# Patient Record
Sex: MALE | Race: WHITE | NOT HISPANIC OR LATINO | URBAN - METROPOLITAN AREA
[De-identification: names, ages, dates, MRNs, and addresses within clinical notes are randomized per-mention and may not be internally consistent; named-entity substitution may affect disease eponyms.]

---
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
## 2020-10-13 NOTE — ED PROVIDER NOTE - NO SIGNIFICANT PAST SURGICAL HISTORY
Quality 110: Preventive Care And Screening: Influenza Immunization: Influenza Immunization Administered during Influenza season Detail Level: Detailed <<----- Click to add NO significant Past Surgical History

## 2021-10-29 NOTE — ED PROVIDER NOTE - CROS ED ROS STATEMENT
10/29/21 0900   C-SSRS (Frequent Screen)   2. Have you actually had any thoughts of killing yourself? Yes   3. Have you been thinking about how you might do this? No   4. Have you had these thoughts and had some intention of acting on them? No   5. Have you started to work out or worked out the details of how to kill yourself? Do you intend to carry out this plan?  No   6. Have you done anything, started to do anything, or prepared to do anything to end your life? No   Suicide Evaluation Low Risk   Nursing Suicide Assessment Note - PHP    Current assessment:    Current C-SSRS score: (P) Low Risk     Protective Factors / Reason for Living: Responsibility to children    Interventions:    -   Maintain current plan of care   all other ROS negative except as per HPI

## 2023-08-11 NOTE — ED PROVIDER NOTE - RADIOLOGY FINDINGS
Called pt to give pre-op instructions for angiogram tomorrow. Pt aware that a responsible adult must accompany pt home after sedation. Explained the importance of maintaining NPO status after midnight before procedure. Pt to hold lasix, fish oil, and coumadin in AM, take other meds with small sip of water. Pt understands and agrees to all pre-op instructions.   
Pt discharged to self care pending echo. No signs of distress, no complaints.  Discharge instructions provided, verbalizes understanding. Wheelchair declined. Pt accompanied to ride's car.  
Pt status post Bilateral heart cath. Right groin site intact without signs of bleeding or hematoma. Bedrest completed. Pt voided. Pt drank and ate. Report given to 4 tele RN. Pt transferred to 4 tele in stable condition with all belongings via cart.  
reviewed by me

## 2023-09-29 NOTE — ED CDU PROVIDER NOTE - CROS ED CONS ALL NEG
Called patient and had to leave a vm reminding them of their upcoming appt on 10/03/23.     Electronically signed by Raisa Walton MA on 9/29/2023 at 3:20 PM
negative...